# Patient Record
Sex: MALE | Race: WHITE | NOT HISPANIC OR LATINO | Employment: FULL TIME
[De-identification: names, ages, dates, MRNs, and addresses within clinical notes are randomized per-mention and may not be internally consistent; named-entity substitution may affect disease eponyms.]

---

## 2017-04-27 DIAGNOSIS — B00.9 HSV (HERPES SIMPLEX VIRUS) INFECTION: ICD-10-CM

## 2017-04-27 NOTE — TELEPHONE ENCOUNTER
Routing refill request to provider for review/approval because:  Labs not current:  Needs yearly creatinine  Pended 1 month supply (#4)  Due for physical  Marychuy ALFONSO RN      Creatinine   Date Value Ref Range Status   06/09/2010 1.00 0.66 - 1.25 mg/dL Final     Comment:     New IDMS-traceable calibration  beginning 5/1/08

## 2017-04-27 NOTE — TELEPHONE ENCOUNTER
Valtrex      Last Written Prescription Date:  4/27/16  Last Fill Quantity: 4,   # refills: 11  Last Office Visit with G, P or [facility] prescribing provider: 4/25/16  Future Office visit:     shira ford

## 2017-04-28 RX ORDER — VALACYCLOVIR HYDROCHLORIDE 1 G/1
TABLET, FILM COATED ORAL
Qty: 4 TABLET | Refills: 5 | Status: SHIPPED | OUTPATIENT
Start: 2017-04-28

## 2019-11-08 ENCOUNTER — HEALTH MAINTENANCE LETTER (OUTPATIENT)
Age: 39
End: 2019-11-08

## 2020-12-06 ENCOUNTER — HEALTH MAINTENANCE LETTER (OUTPATIENT)
Age: 40
End: 2020-12-06

## 2021-09-25 ENCOUNTER — HEALTH MAINTENANCE LETTER (OUTPATIENT)
Age: 41
End: 2021-09-25

## 2022-01-15 ENCOUNTER — HEALTH MAINTENANCE LETTER (OUTPATIENT)
Age: 42
End: 2022-01-15

## 2022-08-21 ENCOUNTER — MYC MEDICAL ADVICE (OUTPATIENT)
Dept: FAMILY MEDICINE | Facility: CLINIC | Age: 42
End: 2022-08-21

## 2022-08-21 DIAGNOSIS — B00.9 HSV (HERPES SIMPLEX VIRUS) INFECTION: ICD-10-CM

## 2022-08-22 ENCOUNTER — VIRTUAL VISIT (OUTPATIENT)
Dept: FAMILY MEDICINE | Facility: CLINIC | Age: 42
End: 2022-08-22
Payer: COMMERCIAL

## 2022-08-22 DIAGNOSIS — B00.9 HSV (HERPES SIMPLEX VIRUS) INFECTION: ICD-10-CM

## 2022-08-22 DIAGNOSIS — B00.1 COLD SORE: Primary | ICD-10-CM

## 2022-08-22 PROCEDURE — 99213 OFFICE O/P EST LOW 20 MIN: CPT | Mod: GT | Performed by: FAMILY MEDICINE

## 2022-08-22 RX ORDER — VALACYCLOVIR HYDROCHLORIDE 1 G/1
2000 TABLET, FILM COATED ORAL 2 TIMES DAILY
Qty: 8 TABLET | Refills: 1 | Status: SHIPPED | OUTPATIENT
Start: 2022-08-22 | End: 2022-10-27

## 2022-08-22 RX ORDER — VALACYCLOVIR HYDROCHLORIDE 1 G/1
TABLET, FILM COATED ORAL
Qty: 4 TABLET | Refills: 5 | Status: CANCELLED | OUTPATIENT
Start: 2022-08-22

## 2022-08-22 NOTE — PROGRESS NOTES
Jasvir is a 42 year old who is being evaluated via a billable video visit.      How would you like to obtain your AVS? MyChart  If the video visit is dropped, the invitation should be resent by: Text to cell phone: [phone]  Will anyone else be joining your video visit? No        Video-Visit Details    Video Start Time: 3:03    Type of service:  Video Visit    Video End Time:3:09 PM    Originating Location (pt. Location): Home    Distant Location (provider location):  [facility]     Platform used for Video Visit: [employer]    Problem List Items Addressed This Visit        Infectious/Inflammatory    HSV (herpes simplex virus) infection      Other Visit Diagnoses     Cold sore    -  Primary    Relevant Medications    valACYclovir (VALTREX) 1000 mg tablet             Subjective   Jasvir is a 42 year old who presents today via a virtual visit regarding cold sores.  The patient states that it has probably been 4 years since he last needed an antiviral medication.  However, starting last night he felt tingling around his lips and then had a cold sore breakout.  He has found that antiviral medication is quite effective and is requesting a prescription today.             Objective           Vitals:  No vitals were obtained today due to virtual visit.    Physical Exam   GENERAL: healthy, alert and no distress      This note has been dictated using voice recognition software. Any grammatical or context distortions are unintentional and inherent to the software

## 2022-10-06 ENCOUNTER — MYC MEDICAL ADVICE (OUTPATIENT)
Dept: FAMILY MEDICINE | Facility: CLINIC | Age: 42
End: 2022-10-06

## 2022-10-27 ENCOUNTER — OFFICE VISIT (OUTPATIENT)
Dept: FAMILY MEDICINE | Facility: CLINIC | Age: 42
End: 2022-10-27
Payer: COMMERCIAL

## 2022-10-27 VITALS
TEMPERATURE: 97.7 F | BODY MASS INDEX: 25.19 KG/M2 | OXYGEN SATURATION: 99 % | WEIGHT: 186 LBS | DIASTOLIC BLOOD PRESSURE: 62 MMHG | HEART RATE: 64 BPM | HEIGHT: 72 IN | RESPIRATION RATE: 16 BRPM | SYSTOLIC BLOOD PRESSURE: 102 MMHG

## 2022-10-27 DIAGNOSIS — B07.0 PLANTAR WARTS: ICD-10-CM

## 2022-10-27 DIAGNOSIS — Z00.00 ROUTINE GENERAL MEDICAL EXAMINATION AT A HEALTH CARE FACILITY: Primary | ICD-10-CM

## 2022-10-27 DIAGNOSIS — Z11.3 SCREEN FOR STD (SEXUALLY TRANSMITTED DISEASE): ICD-10-CM

## 2022-10-27 DIAGNOSIS — E66.3 OVERWEIGHT (BMI 25.0-29.9): ICD-10-CM

## 2022-10-27 DIAGNOSIS — Z23 NEED FOR IMMUNIZATION AGAINST INFLUENZA: ICD-10-CM

## 2022-10-27 DIAGNOSIS — Z23 NEED FOR COVID-19 VACCINE: ICD-10-CM

## 2022-10-27 DIAGNOSIS — Z13.1 SCREENING FOR DIABETES MELLITUS: ICD-10-CM

## 2022-10-27 DIAGNOSIS — L65.9 ALOPECIA: ICD-10-CM

## 2022-10-27 DIAGNOSIS — Z13.220 SCREENING FOR HYPERLIPIDEMIA: ICD-10-CM

## 2022-10-27 LAB
ALBUMIN SERPL-MCNC: 4.2 G/DL (ref 3.4–5)
ALP SERPL-CCNC: 77 U/L (ref 40–150)
ALT SERPL W P-5'-P-CCNC: 31 U/L (ref 0–70)
ANION GAP SERPL CALCULATED.3IONS-SCNC: 2 MMOL/L (ref 3–14)
AST SERPL W P-5'-P-CCNC: 21 U/L (ref 0–45)
BILIRUB SERPL-MCNC: 0.4 MG/DL (ref 0.2–1.3)
BUN SERPL-MCNC: 20 MG/DL (ref 7–30)
CALCIUM SERPL-MCNC: 8.9 MG/DL (ref 8.5–10.1)
CHLORIDE BLD-SCNC: 108 MMOL/L (ref 94–109)
CHOLEST SERPL-MCNC: 184 MG/DL
CO2 SERPL-SCNC: 30 MMOL/L (ref 20–32)
CREAT SERPL-MCNC: 1.09 MG/DL (ref 0.66–1.25)
FASTING STATUS PATIENT QL REPORTED: YES
GFR SERPL CREATININE-BSD FRML MDRD: 87 ML/MIN/1.73M2
GLUCOSE BLD-MCNC: 107 MG/DL (ref 70–99)
HCV AB SERPL QL IA: NONREACTIVE
HDLC SERPL-MCNC: 49 MG/DL
LDLC SERPL CALC-MCNC: 121 MG/DL
NONHDLC SERPL-MCNC: 135 MG/DL
POTASSIUM BLD-SCNC: 4.4 MMOL/L (ref 3.4–5.3)
PROT SERPL-MCNC: 7.7 G/DL (ref 6.8–8.8)
SODIUM SERPL-SCNC: 140 MMOL/L (ref 133–144)
T PALLIDUM AB SER QL: NONREACTIVE
TRIGL SERPL-MCNC: 72 MG/DL

## 2022-10-27 PROCEDURE — 87389 HIV-1 AG W/HIV-1&-2 AB AG IA: CPT | Performed by: FAMILY MEDICINE

## 2022-10-27 PROCEDURE — 87491 CHLMYD TRACH DNA AMP PROBE: CPT | Performed by: FAMILY MEDICINE

## 2022-10-27 PROCEDURE — 86803 HEPATITIS C AB TEST: CPT | Performed by: FAMILY MEDICINE

## 2022-10-27 PROCEDURE — 90471 IMMUNIZATION ADMIN: CPT | Performed by: FAMILY MEDICINE

## 2022-10-27 PROCEDURE — 86780 TREPONEMA PALLIDUM: CPT | Performed by: FAMILY MEDICINE

## 2022-10-27 PROCEDURE — 36415 COLL VENOUS BLD VENIPUNCTURE: CPT | Performed by: FAMILY MEDICINE

## 2022-10-27 PROCEDURE — 0124A COVID-19,PF,PFIZER BOOSTER BIVALENT: CPT | Performed by: FAMILY MEDICINE

## 2022-10-27 PROCEDURE — 80053 COMPREHEN METABOLIC PANEL: CPT | Performed by: FAMILY MEDICINE

## 2022-10-27 PROCEDURE — 91312 COVID-19,PF,PFIZER BOOSTER BIVALENT: CPT | Performed by: FAMILY MEDICINE

## 2022-10-27 PROCEDURE — 90686 IIV4 VACC NO PRSV 0.5 ML IM: CPT | Performed by: FAMILY MEDICINE

## 2022-10-27 PROCEDURE — 99396 PREV VISIT EST AGE 40-64: CPT | Mod: 25 | Performed by: FAMILY MEDICINE

## 2022-10-27 PROCEDURE — 99214 OFFICE O/P EST MOD 30 MIN: CPT | Mod: 25 | Performed by: FAMILY MEDICINE

## 2022-10-27 PROCEDURE — 87591 N.GONORRHOEAE DNA AMP PROB: CPT | Performed by: FAMILY MEDICINE

## 2022-10-27 PROCEDURE — 80061 LIPID PANEL: CPT | Performed by: FAMILY MEDICINE

## 2022-10-27 RX ORDER — FINASTERIDE 1 MG/1
1 TABLET, FILM COATED ORAL DAILY
COMMUNITY
Start: 2022-10-27

## 2022-10-27 RX ORDER — MINOXIDIL 50 MG/G
1 AEROSOL, FOAM TOPICAL 2 TIMES DAILY
COMMUNITY
Start: 2022-10-27

## 2022-10-27 ASSESSMENT — ENCOUNTER SYMPTOMS
NAUSEA: 0
ABDOMINAL PAIN: 0
DYSURIA: 0
FEVER: 0
JOINT SWELLING: 0
HEADACHES: 0
CHILLS: 0
MYALGIAS: 0
DIZZINESS: 0
PARESTHESIAS: 0
CONSTIPATION: 0
EYE PAIN: 0
SHORTNESS OF BREATH: 0
SORE THROAT: 0
PALPITATIONS: 0
COUGH: 0
HEMATOCHEZIA: 0
DIARRHEA: 0
ARTHRALGIAS: 0
HEARTBURN: 0
WEAKNESS: 0
HEMATURIA: 0
FREQUENCY: 0
NERVOUS/ANXIOUS: 0

## 2022-10-27 ASSESSMENT — PAIN SCALES - GENERAL: PAINLEVEL: NO PAIN (0)

## 2022-10-27 NOTE — RESULT ENCOUNTER NOTE
"Please inform of results if patient has not viewed in [facility] within 3 business days.    Your syphilis test was normal.    CMP Results - Your blood sugar was 107. Your kidney function, electrolytes, and liver function were normal.    Lipids - Your \"bad\" cholesterol was elevated. This can be improved with diet and exercise. All animal based foods such as meat, dairy, and eggs contain cholesterol. All plant based foods such as fruits, nuts, and vegetables do not.    Your other labs are pending.    You do not need a medication for this at this time.    Please call the clinic with any questions you may have.     Have a great day,    Dr. Nj    The 10-year ASCVD risk score (Manisha LOPEZ, et al., 2019) is: 0.8%    Values used to calculate the score:      Age: 42 years      Sex: Male      Is Non- : No      Diabetic: No      Tobacco smoker: No      Systolic Blood Pressure: 102 mmHg      Is BP treated: No      HDL Cholesterol: 49 mg/dL      Total Cholesterol: 184 mg/dL"

## 2022-10-27 NOTE — PROGRESS NOTES
SUBJECTIVE:   CC: Jasvir is an 42 year old who presents for preventative health visit.     Patient has been advised of split billing requirements and indicates understanding: Yes     Healthy Habits:     Getting at least 3 servings of Calcium per day:  Yes    Bi-annual eye exam:  NO    Dental care twice a year:  Yes    Sleep apnea or symptoms of sleep apnea:  None    Diet:  Regular (no restrictions)    Frequency of exercise:  6-7 days/week    Duration of exercise:  Greater than 60 minutes    Taking medications regularly:  Yes    Medication side effects:  Not applicable    PHQ-2 Total Score: 0    Additional concerns today:  Yes    Patient has questions about using topical minoxidil for hair loss.  His friend is on 2.5 mg for this.  He is currently taking finasteride and topical minoxidil.    Additionally he is wondering about a wart on his right heel.  Has tried over-the-counter liquid nitrogen without significant benefit.  Not causing significant symptoms.    Patient runs between 3 and 4 miles daily.    No other questions or concerns.    Today's PHQ-2 Score:   PHQ-2 ( 1999 Pfizer) 10/27/2022   Q1: Little interest or pleasure in doing things 0   Q2: Feeling down, depressed or hopeless 0   PHQ-2 Score 0   Q1: Little interest or pleasure in doing things Not at all   Q2: Feeling down, depressed or hopeless Not at all   PHQ-2 Score 0     Abuse: Current or Past(Physical, Sexual or Emotional)- No  Do you feel safe in your environment? Yes    Have you ever done Advance Care Planning? (For example, a Health Directive, POLST, or a discussion with a medical provider or your loved ones about your wishes): Yes, patient states has an Advance Care Planning document and will bring a copy to the clinic.    Social History     Tobacco Use     Smoking status: Never     Smokeless tobacco: Never   Substance Use Topics     Alcohol use: Not Currently     Comment: monae     Alcohol Use 10/27/2022   Prescreen: >3 drinks/day or >7 drinks/week?  No     Last PSA: No results found for: PSA    Reviewed orders with patient. 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     Elevated LFT's     CARDIOVASCULAR SCREENING; LDL GOAL LESS THAN 160     Adjustment disorder with anxiety     HSV (herpes simplex virus) infection     Past Surgical History:   Procedure Laterality Date     EYE SURGERY      laser surgery     VASECTOMY  2011     ZZC NONSPECIFIC PROCEDURE  01/01/1998    wisdom teeth pulled       Social History     Tobacco Use     Smoking status: Never     Smokeless tobacco: Never   Substance Use Topics     Alcohol use: Not Currently     Comment: occa     Family History   Problem Relation Age of Onset     Allergies Mother      Depression Mother      Asthma Mother      Diabetes Father         adult onset, obesity     Hypertension Father      Glaucoma Father      Hypertension Brother      Depression Maternal Grandmother         needed ECT therapy     Cancer Maternal Grandfather      Heart Disease Paternal Grandfather      Diabetes Paternal Grandfather      Hypertension Paternal Grandfather      Circulatory Paternal Grandfather      Glaucoma Paternal Grandfather      Multiple Sclerosis Paternal Cousin          Current Outpatient Medications   Medication Sig Dispense Refill     finasteride (PROPECIA) 1 MG tablet Take 1 tablet (1 mg) by mouth daily       Minoxidil (MINOXIDIL FOR MEN) 5 % FOAM Externally apply 1 Application topically 2 times daily       valACYclovir (VALTREX) 1000 mg tablet TAKE 2 TABLETS BY MOUTH TWICE DAILY 4 tablet 5     Allergies   Allergen Reactions     Amoxicillin Rash     Reviewed and updated as needed this  visit by clinical staff   Tobacco  Allergies  Meds  Problems  Med Hx  Surg Hx  Fam Hx  Soc   Hx        Reviewed and updated as needed this visit by Provider   Tobacco  Allergies  Meds  Problems  Med Hx  Surg Hx  Fam Hx       Social history reviewed.  Past Medical History:   Diagnosis Date     Collar bone fracture     age 14 - sledding accident     Depressive disorder 2012    took medication for anxiety      Past Surgical History:   Procedure Laterality Date     EYE SURGERY      laser surgery     VASECTOMY  2011     Z NONSPECIFIC PROCEDURE  01/01/1998    wisdom teeth pulled     Review of Systems   Constitutional: Negative for chills and fever.   HENT: Negative for congestion, ear pain, hearing loss and sore throat.    Eyes: Positive for visual disturbance. Negative for pain.   Respiratory: Negative for cough and shortness of breath.    Cardiovascular: Negative for chest pain, palpitations and peripheral edema.   Gastrointestinal: Negative for abdominal pain, constipation, diarrhea, heartburn, hematochezia and nausea.   Genitourinary: Negative for dysuria, frequency, genital sores, hematuria, impotence, penile discharge and urgency.   Musculoskeletal: Negative for arthralgias, joint swelling and myalgias.   Skin: Negative for rash.   Neurological: Negative for dizziness, weakness, headaches and paresthesias.   Psychiatric/Behavioral: Negative for mood changes. The patient is not nervous/anxious.      OBJECTIVE:   /62 (BP Location: Left arm, Patient Position: Chair, Cuff Size: Adult Large)   Pulse 64   Temp 97.7  F (36.5  C) (Temporal)   Resp 16   Ht 1.829 m (6')   Wt 84.4 kg (186 lb)   SpO2 99%   BMI 25.23 kg/m      Physical Exam  GENERAL: healthy, alert and no distress  EYES: Eyes grossly normal to inspection, PERRL and conjunctivae and sclerae normal  HENT: ear canals and TM's normal, nose and mouth without ulcers or lesions  NECK: no adenopathy, no asymmetry, masses, or scars and  thyroid normal to palpation  RESP: lungs clear to auscultation - no rales, rhonchi or wheezes  CV: regular rate and rhythm, normal S1 S2, no S3 or S4, no murmur, click or rub, no peripheral edema and peripheral pulses strong  ABDOMEN: soft, nontender, no hepatosplenomegaly, no masses and bowel sounds normal  MS: no gross musculoskeletal defects noted, no edema  SKIN: Plantar wart right lateral heel. Thinning hair in male pattern on scalp. No suspicious lesions or rashes  NEURO: Normal strength and tone, mentation intact and speech normal  PSYCH: mentation appears normal, affect normal/bright    Labs: pending    ASSESSMENT/PLAN:   1. Routine general medical examination at a health care facility:  Discussed personal health and safety. Routine screenings as below. Appropriate anticipatory guidance and health promotion delivered according to the USPSTF guidelines.  Patient understands and is agreeable to plan.  - REVIEW OF HEALTH MAINTENANCE PROTOCOL ORDERS  - Hepatitis C Screen Reflex to HCV RNA Quant and Genotype; Future  - INFLUENZA VACCINE IM > 6 MONTHS VALENT IIV4 (AFLURIA/FLUZONE)  - COVID-19,PF,PFIZER BOOSTER BIVALENT (12+YRS)  - Lipid panel reflex to direct LDL Fasting; Future  - HIV Antigen Antibody Combo; Future  - Neisseria gonorrhoeae PCR; Future  - Chlamydia trachomatis PCR; Future  - Treponema Abs w Reflex to RPR and Titer; Future  - Comprehensive metabolic panel (BMP + Alb, Alk Phos, ALT, AST, Total. Bili, TP); Future    2. Alopecia: Patient currently taking minoxidil and Propecia prescribed by outside provider.  Updated medication list.  Had questions in regards to using oral minoxidil.  I did discuss that while this is occurring there are significant cardiac side effects that are possible and risks may not outweigh the benefit.  Patient agreeable.  - Minoxidil (MINOXIDIL FOR MEN) 5 % FOAM; Externally apply 1 Application topically 2 times daily  - finasteride (PROPECIA) 1 MG tablet; Take 1 tablet (1 mg)  by mouth daily    3. Plantar warts: Located right heel.  Pared down today with 15 blade.  Discussed it is likely cheaper to have patient treat this at home with topical liquid nitrogen.  He will return if recurrent.    4. Screening for hyperlipidemia  - Lipid panel reflex to direct LDL Fasting; Future    5. Screening for diabetes mellitus  - Comprehensive metabolic panel (BMP + Alb, Alk Phos, ALT, AST, Total. Bili, TP); Future    6. Screen for STD (sexually transmitted disease):   - Hepatitis C Screen Reflex to HCV RNA Quant and Genotype; Future  - HIV Antigen Antibody Combo; Future  - Neisseria gonorrhoeae PCR; Future  - Chlamydia trachomatis PCR; Future  - Treponema Abs w Reflex to RPR and Titer; Future    7. Need for COVID-19 vaccine  - COVID-19,PF,PFIZER BOOSTER BIVALENT (12+YRS)    8. Need for immunization against influenza  - INFLUENZA VACCINE IM > 6 MONTHS VALENT IIV4 (AFLURIA/FLUZONE)    9. Overweight (BMI 25.0-29.9): Encourage continued exercise and approximate 5 pound weight loss to main healthy BMI goal less than 25.  Borderline currently.      COUNSELING:   Reviewed preventive health counseling, as reflected in patient instructions       Regular exercise       Healthy diet/nutrition       Vision screening       Hearing screening    Estimated body mass index is 25.23 kg/m  as calculated from the following:    Height as of this encounter: 1.829 m (6').    Weight as of this encounter: 84.4 kg (186 lb).     Weight management plan: Discussed healthy diet and exercise guidelines    He reports that he has never smoked. He has never used smokeless tobacco.    Counseling Resources:  ATP IV Guidelines  Pooled Cohorts Equation Calculator  FRAX Risk Assessment  ICSI Preventive Guidelines  Dietary Guidelines for Americans, 2010  USDA's MyPlate  ASA Prophylaxis  Lung CA Screening    Arnaldo Cabrera MD  [facility] [place]    This chart is completed utilizing dictation software; typos  and/or incorrect word substitutions may unintentionally occur.

## 2022-10-28 LAB
C TRACH DNA SPEC QL NAA+PROBE: NEGATIVE
HIV 1+2 AB+HIV1 P24 AG SERPL QL IA: NONREACTIVE
N GONORRHOEA DNA SPEC QL NAA+PROBE: NEGATIVE

## 2022-10-28 NOTE — RESULT ENCOUNTER NOTE
Please inform of results if patient has not viewed in [employer] within 3 business days.    Your hepatitis C lab was normal.    Please call the clinic with any questions you may have.     Have a great day,    Dr. Nj

## 2022-10-28 NOTE — RESULT ENCOUNTER NOTE
Please inform of results if patient has not viewed in [employer] within 3 business days.    Your STD screening labs were normal.     Please call the clinic with any questions you may have.     Have a great day,    Dr. Nj

## 2022-10-28 NOTE — RESULT ENCOUNTER NOTE
Please inform of results if patient has not viewed in [employer] within 3 business days.    Your HIV screening lab was normal.    Please call the clinic with any questions you may have.     Have a great day,    Dr. Nj

## 2022-11-22 ENCOUNTER — MYC MEDICAL ADVICE (OUTPATIENT)
Dept: FAMILY MEDICINE | Facility: CLINIC | Age: 42
End: 2022-11-22

## 2022-11-22 DIAGNOSIS — B00.1 COLD SORE: ICD-10-CM

## 2022-11-22 RX ORDER — VALACYCLOVIR HYDROCHLORIDE 1 G/1
2000 TABLET, FILM COATED ORAL 2 TIMES DAILY
Qty: 8 TABLET | Refills: 1 | Status: SHIPPED | OUTPATIENT
Start: 2022-11-22 | End: 2023-11-02

## 2022-11-22 NOTE — TELEPHONE ENCOUNTER
Patient requesting refill of valtrex, prepped below. Last discussed in [facility] on 8/22/22. Please review and sign if agree

## 2023-05-31 ENCOUNTER — MYC MEDICAL ADVICE (OUTPATIENT)
Dept: FAMILY MEDICINE | Facility: CLINIC | Age: 43
End: 2023-05-31
Payer: COMMERCIAL

## 2023-05-31 DIAGNOSIS — B07.0 PLANTAR WARTS: Primary | ICD-10-CM

## 2023-05-31 NOTE — TELEPHONE ENCOUNTER
"Patient last seen 10/27/2023.     Visit notes: \"Plantar warts: Located right heel.  Pared down today with 15 blade.  Discussed it is likely cheaper to have patient treat this at home with topical liquid nitrogen.  He will return if recurrent.\"    Would you like a visit scheduled or any further recommendations for at home treatment?    PHYLLIS Hood, RN  [facility] ~ Registered Nurse  Clinic Triage ~ [facility]  May 31, 2023        "

## 2023-11-01 DIAGNOSIS — B00.1 COLD SORE: ICD-10-CM

## 2023-11-02 RX ORDER — VALACYCLOVIR HYDROCHLORIDE 1 G/1
2000 TABLET, FILM COATED ORAL 2 TIMES DAILY
Qty: 8 TABLET | Refills: 0 | Status: SHIPPED | OUTPATIENT
Start: 2023-11-02

## 2023-12-02 ENCOUNTER — HEALTH MAINTENANCE LETTER (OUTPATIENT)
Age: 43
End: 2023-12-02

## 2025-01-05 ENCOUNTER — HEALTH MAINTENANCE LETTER (OUTPATIENT)
Age: 45
End: 2025-01-05

## 2025-03-12 ENCOUNTER — TRANSFERRED RECORDS (OUTPATIENT)
Dept: HEALTH INFORMATION MANAGEMENT | Facility: CLINIC | Age: 45
End: 2025-03-12
Payer: COMMERCIAL